# Patient Record
Sex: MALE
[De-identification: names, ages, dates, MRNs, and addresses within clinical notes are randomized per-mention and may not be internally consistent; named-entity substitution may affect disease eponyms.]

---

## 2023-09-30 ENCOUNTER — NURSE TRIAGE (OUTPATIENT)
Dept: OTHER | Facility: CLINIC | Age: 33
End: 2023-09-30

## 2023-09-30 NOTE — TELEPHONE ENCOUNTER
Location of patient: [place]    Subjective: Caller states \"Caller was seen in the ER yesterday was prescribed antibiotics but they did not send them to his pharmacy. \"       Pt transferred to [facility]. Recommended disposition:  duplicate encounter    Care advice provided, patient verbalizes understanding; denies any other questions or concerns. Outcome: Pt denies new or worsening symptoms and instructed to call back if unable to get ahold of provider      This triage is a result of a call to the [employer]    Reason for Disposition   Caller has already spoken with another triager and has no further questions.     Protocols used: No Contact or Duplicate Contact Call-ADULT-